# Patient Record
(demographics unavailable — no encounter records)

---

## 2025-04-02 NOTE — HISTORY OF PRESENT ILLNESS
[FreeTextEntry1] : 67 Y/O HERE C/O VAGINAL SPOTTING X 4 DAYS. PMHX;/ PSHX;EAR SURGERY  D/C SOCIAL;-ETOH -CIGG STD/ FAMILY HX OF BREAST CANCER;NO NKDS MEDICATION RECONCILLIATION DONE.  PE;BREASTS;- MASSES DC NODES SBE ABDOMEN SOFT NT ND NL GENITALIA VAGINA -DC CX-CMT  AT OS POLYP/MASS NOTED UTERUS NL SIZE NT ADNEXA NT - MASSES  -TIME OUT DONE  EBL LESS THAN 5CC -ENDO BX DONE WITHOUT INCIDENT -INSTRUCTIONS REVIEWED -RTC FOR CERVICAL BX -SONO ORDERED

## 2025-04-03 NOTE — HISTORY OF PRESENT ILLNESS
[FreeTextEntry1] : 67 Y/O HERE C/O VAGINAL SPOTTING AND HERE FOR CERVICAL BX;INFORMED CONSENT OBTAINED RBA DISCUSSED. PMHX;/ PSHX;EAR SURGERY  D/C SOCIAL;-ETOH -CIGG STD/ FAMILY HX OF BREAST CANCER;NO NKDS MEDICATION RECONCILLIATION DONE.  PE;BREASTS;- MASSES DC NODES SBE ABDOMEN SOFT NT ND NL GENITALIA VAGINA -DC CX-CMT  AT OS POLYP/MASS NOTED UTERUS NL SIZE NT ADNEXA NT - MASSES  -TIME OUT DONE  EBL LESS THAN 5CC -CERVICAL BX DONE WITHOUT INCIDENT -INSTRUCTIONS REVIEWED -SONO ORDERED -F-U AFTER ABOVE